# Patient Record
Sex: FEMALE | Employment: OTHER
[De-identification: names, ages, dates, MRNs, and addresses within clinical notes are randomized per-mention and may not be internally consistent; named-entity substitution may affect disease eponyms.]

---

## 2024-02-12 ENCOUNTER — TELEPHONE (OUTPATIENT)
Dept: SCHEDULING | Age: 89
End: 2024-02-12
Payer: MEDICARE

## 2024-02-12 NOTE — TELEPHONE ENCOUNTER
Placed call to patient to dorita FUV on 02/26 with Harshad to [facility]. Patient declined to dorita at this time until she can confirm a ride to [facility] for a new appt.     Patient will check and see if she is able to make transportation arrangements to that location or not and will call back to [place] to reschedule either way.

## 2024-02-13 NOTE — TELEPHONE ENCOUNTER
Patient called back this morning and does not want to drive to [facility] for care. Patient is requesting to see Dr. Shen here at [place].     I told her we would update the clinical teams to obtain a authorization to schedule with new physician and call her back to schedule. Patient understood.

## 2024-02-26 ENCOUNTER — APPOINTMENT (OUTPATIENT)
Dept: HEMATOLOGY/ONCOLOGY | Facility: CLINIC | Age: 89
End: 2024-02-26
Payer: MEDICARE

## 2024-02-29 RX ORDER — LOSARTAN POTASSIUM AND HYDROCHLOROTHIAZIDE 12.5; 5 MG/1; MG/1
TABLET ORAL
COMMUNITY
Start: 2024-01-26

## 2024-02-29 RX ORDER — MELOXICAM 7.5 MG/1
TABLET ORAL
COMMUNITY
Start: 2023-11-20

## 2024-02-29 RX ORDER — ANASTROZOLE 1 MG/1
TABLET ORAL
COMMUNITY
Start: 2024-01-26 | End: 2024-03-04 | Stop reason: WASHOUT

## 2024-02-29 RX ORDER — ATORVASTATIN CALCIUM 10 MG/1
TABLET, FILM COATED ORAL
COMMUNITY
Start: 2023-12-01

## 2024-03-04 ENCOUNTER — OFFICE VISIT (OUTPATIENT)
Dept: HEMATOLOGY/ONCOLOGY | Facility: CLINIC | Age: 89
End: 2024-03-04
Payer: MEDICARE

## 2024-03-04 VITALS
WEIGHT: 191.14 LBS | OXYGEN SATURATION: 95 % | RESPIRATION RATE: 16 BRPM | BODY MASS INDEX: 31.77 KG/M2 | TEMPERATURE: 98.6 F | DIASTOLIC BLOOD PRESSURE: 72 MMHG | SYSTOLIC BLOOD PRESSURE: 156 MMHG | HEART RATE: 81 BPM

## 2024-03-04 DIAGNOSIS — Z17.0 MALIGNANT NEOPLASM OF BREAST IN FEMALE, ESTROGEN RECEPTOR POSITIVE, UNSPECIFIED LATERALITY, UNSPECIFIED SITE OF BREAST (MULTI): Primary | ICD-10-CM

## 2024-03-04 DIAGNOSIS — Z09 ENCOUNTER FOR FOLLOW-UP EXAMINATION AFTER COMPLETED TREATMENT FOR CONDITIONS OTHER THAN MALIGNANT NEOPLASM: ICD-10-CM

## 2024-03-04 DIAGNOSIS — C50.919 MALIGNANT NEOPLASM OF BREAST IN FEMALE, ESTROGEN RECEPTOR POSITIVE, UNSPECIFIED LATERALITY, UNSPECIFIED SITE OF BREAST (MULTI): Primary | ICD-10-CM

## 2024-03-04 PROCEDURE — 99214 OFFICE O/P EST MOD 30 MIN: CPT | Performed by: STUDENT IN AN ORGANIZED HEALTH CARE EDUCATION/TRAINING PROGRAM

## 2024-03-04 PROCEDURE — 1159F MED LIST DOCD IN RCRD: CPT | Performed by: STUDENT IN AN ORGANIZED HEALTH CARE EDUCATION/TRAINING PROGRAM

## 2024-03-04 PROCEDURE — 1126F AMNT PAIN NOTED NONE PRSNT: CPT | Performed by: STUDENT IN AN ORGANIZED HEALTH CARE EDUCATION/TRAINING PROGRAM

## 2024-03-04 RX ORDER — ANASTROZOLE 1 MG/1
1 TABLET ORAL DAILY
Qty: 90 TABLET | Refills: 4 | Status: SHIPPED | OUTPATIENT
Start: 2024-03-04 | End: 2025-03-04

## 2024-03-04 ASSESSMENT — PAIN SCALES - GENERAL: PAINLEVEL: 0-NO PAIN

## 2024-03-04 ASSESSMENT — ENCOUNTER SYMPTOMS
DEPRESSION: 0
OCCASIONAL FEELINGS OF UNSTEADINESS: 0
LOSS OF SENSATION IN FEET: 1

## 2024-03-04 NOTE — PROGRESS NOTES
Breast [facility] Oncology Clinic  Location: [place]    Visit Type: Follow-up Visit    Oncologic History:    : Breast cancer of L breast. Details unknown, no records at this time. Per patient  she underwent lumpectomy, chemotherapy, radiation, tamoxifen for 5 years followed by aromatase inhibitor for a period of time.    Summer 2021: Bumped her chest in a doorway. Shortly later noticed bruised area in the R breast.    21: Diagnostic mammogram and US: right breast with multiple various size oval masses in the upper outer aspect middle depth. Irregular high density mass with a spiculated and indistinct margin in the right breast upper outer aspect middle depth.  Correlates with palpable. US shows this to be 2.2 X 2.4 X 2.3 cm irregular mass with an irregular margin in the right breast 10:00 6 cm from nipple. Mass is hypoechoic. There is also a 0.6X0.6X0.4 cm mass with a circumscribed margin the right breast at  9:00 middle depth 5 cm from the nipple. Middle other nodules present within the upper outer quadrant including a 3 mm round nodule at 11:00 4 cm from nipple and another 4 mm oval nodule at 9:00 3-4 cm from nipple. No abnormalities in axilla.     12/3/21: Right breast core needle biopsy at palpable lesion showed invasive moderately differentiated ductal carcinoma. Second area seen on US showed apocrine metaplasia with cyst formation and focal possible atypical lobular hyperplasia. ER positive  (%), PER positive (%), HER2 negative. Other small nodules not biopsied.   21: Right partial mastectomy with Dr. Padilla. Surgical pathology yielded invasive ductal carcinoma, 25mm, G2, invasive carcinoma margins negative, no lymph nodes submitted. [id number], stage IA    22: Started adjuvant anastrozole. Declined radiation   1/10/23: Follow-up R breast imagin.1 cm oval mass in the right breast at 10 o'clock middle depth is suspicious of malignancy. 6 mm adjacent mass.   23: R  breast bx: negative for malignancy, post-surgical changes  23: Diagnostic R breast imagin cm x 0.6 cm x 0.6 cm lobulated lesion in the right breast has a differential diagnosis of fat necrosis.  A surgical consult is   recommended.  The largest area is somewhat more prominent than the prior ultrasound.  Mammographically there has been no change.   24: Diagnostic R breast imagin.1 cm irregular mass with an angular margin in the right breast at 9 o'clock middle depth.  Previously this mass was measured as 2 separate masses but it appears to be one mass. The mass is not significantly changed in size.     Subjective: Interval History    Denies interval events since last follow-up- no recent hospitalizations, new medical diagnoses, or new medications.     Remains on anastrozole and is tolerating well.     There is a lesion in the R breast breast that is being followed. Subtle changes overtime. Biopsy has been recommended. She has elected to defer biopsy and prefers to monitor. Repeat imaging planned for 2024.     Denies weight loss, changes in the breast and/or chest wall, new aches or pains, changes in appetite or energy level. No current concerns at this time.     Gynecologic History:     Menarche 14, Menopause in her 60s  , 24 years old at first live birth  Underwent TAHBSO for cancer risk reduction  Used OCP for 10 years.   Did not use HRT or fertility treatments.   Has not undergone genetic testing    Pertinent Family history:    Breast Cancer family history in 2 sisters, daughter and paternal cousin.  No family history of ovarian cancer.   Father had prostate cancer.     Social History  Rebeca Muir  has no history on file for tobacco use.  She  has no history on file for alcohol use.  She  has no history on file for drug use.    ROS:     Review of Systems   All other systems reviewed and are negative.       Physical Examination:    /72   Pulse 81   Temp 37 °C (98.6 °F)   Resp 16  "  Wt 86.7 kg (191 lb 2.2 oz)   SpO2 95%   BMI 31.77 kg/m²     Physical Exam  Vitals and nursing note reviewed.   Constitutional:       General: She is not in acute distress.     Appearance: Normal appearance. She is not toxic-appearing.   HENT:      Head: Normocephalic and atraumatic.   Eyes:      Conjunctiva/sclera: Conjunctivae normal.   Cardiovascular:      Rate and Rhythm: Normal rate.   Pulmonary:      Effort: Pulmonary effort is normal. No respiratory distress.   Abdominal:      General: Abdomen is flat.      Palpations: Abdomen is soft.   Musculoskeletal:         General: No swelling. Normal range of motion.      Cervical back: Normal range of motion.   Skin:     General: Skin is warm and dry.   Neurological:      General: No focal deficit present.      Mental Status: She is alert.   Psychiatric:         Mood and Affect: Mood normal.       Breast Examination:    Left breast: No masses, skin or nipple changes  Right breast:  10:00 7 CFN 5 mm nodule at edge of prior surgical incision   Axillary: No significant examination findings    ECOG Performance Status:     [x] 0 Fully active, able to carry on all pre-disease performance without restriction  [] 1 Restricted in physically strenuous activity but ambulatory and able to carry out work of a light or sedentary nature, e.g., light house work, office work  [] 2 Ambulatory and capable of all selfcare but unable to carry out any work activities; up and about more than 50% of waking hours  [] 3 Capable of only limited selfcare; confined to bed or chair more than 50% of waking hours  [] 4 Completely disabled; cannot carry on any selfcare; totally confined to bed or chair  [] 5 Dead     Results:    Labs:  No new pertinent results since last visit    No results found for: \"WBC\", \"HGB\", \"HCT\", \"MCV\", \"PLT\", \"ANC\"    Chemistry    No results found for: \"NA\", \"K\", \"CL\", \"CO2\", \"BUN\", \"CREATININE\", \"GLU\" No results found for: \"CALCIUM\", \"ALKPHOS\", \"AST\", \"ALT\", \"BILITOT\" "          Imaging:  Reviewed above in Onc History    Pathology:  Reviewed above in Onc History    Assessment:     1. Pathologic prognostic stage IA (pT2 pNx cMx) invasive ductal carcinoma of the right breast; Grade 2; ER positive  (%), AK positive (%), HER2 negative;  S/p PM without SLNbx. On adjuvant anastrozole since Jan 2022. Declined radiation.     2. Right breast with area of apocrine metaplasia with cyst formation and focal possible atypical lobular hyperplasia.      3. Prior breast cancer in left breast in 1999 s/p PM, chemotherapy, radiation and endocrine therapy.     Tolerating anastrozole well. There is a small nodule in the R breast that is slowly changing over time. Biopsy has been recommended and patient has opted for surveillance. Repeat breast imaging planned for July 2024.       Plan:    Surgical Plan: S/p lumpectomy with Dr. Padilla. SLNBx not performed.   Additional biopsy: Not indicated at this time.  Radiation Plan: Met with radiation oncology, declined.  Additional staging scans/DEXA/echo: Staging scans not indicated given patient history and pathologic features. DEXA scan showed osteopenia. Repeat ordered.   Additional Path info (i.e Ki67, PDL1): Not indicated  Gene assays: Discussed oncotype however, given low clinical risk and patient age we agreed to defer testing as this would not .      Systemic treatment plan:  anastrozole for 5 years or as she is able to tolerate.                    Intent: Curative                Clinical trial: No clinical trial available for this patient                Endocrine therapy: Anastrozole as above                HER2 treatment: Not indicated                Targeted agents: Not indicated                Chemotherapy: Not indicated                BMA: Not indicated     Access: Not indicated  Supportive meds/referrals: Not indicated  Genetic testing: Strong family history, no prior genetic testing. Would not change treatment for  patient but would have implications for family members. Patient and her daughter are interested in exploring  this and will let me know if they would like a referral.   Fertility preservation: Not indicated  Other active problems/orders:      We spent a portion of our encounter discussing lifestyle modifications that may help with cancer outcomes and overall wellbeing. We discussed  regular exercise aiming for 30 minutes 5 times a week. We also discussed diet modifications such as limiting red meat and processed foods. We also discussed limiting alcohol intake.   Abnormal R breast imaging 1/2023, negative bx. There is a small nodule in the R breast that is slowly changing over time. Biopsy has been recommended and patient has opted for surveillance. Repeat breast imaging planned for July 2024.     Surveillance plan: Repeat planned for July 2024    Follow-up: 6 months    Patient expressed understanding of the plan outlined above. She had ample time to ask questions. She understands she can contact us should she have additional questions or issues arise in the interim.

## 2024-05-03 ENCOUNTER — TELEPHONE (OUTPATIENT)
Dept: HEMATOLOGY/ONCOLOGY | Facility: HOSPITAL | Age: 89
End: 2024-05-03
Payer: MEDICARE

## 2024-05-03 NOTE — TELEPHONE ENCOUNTER
Spoke to patient, patient would like Dr. Shen to review her dexa scan from 4/17/2024 at [facility] to her dexa scan at  on 2/1/22, secure chat sent to provider to review

## 2024-05-03 NOTE — TELEPHONE ENCOUNTER
Spoke to patient, advised Dr. Shen will call her Monday to review this, patient agreeable with plan, no further needs

## 2024-05-07 ENCOUNTER — TELEPHONE (OUTPATIENT)
Dept: HEMATOLOGY/ONCOLOGY | Facility: CLINIC | Age: 89
End: 2024-05-07
Payer: MEDICARE

## 2024-05-07 NOTE — TELEPHONE ENCOUNTER
I called patient to discuss DEXA again. Call went to . I left message with instructions to call back. May need to set up a time to talk.

## 2024-09-16 ENCOUNTER — APPOINTMENT (OUTPATIENT)
Dept: HEMATOLOGY/ONCOLOGY | Facility: CLINIC | Age: 89
End: 2024-09-16
Payer: MEDICARE

## 2024-09-23 ENCOUNTER — OFFICE VISIT (OUTPATIENT)
Dept: HEMATOLOGY/ONCOLOGY | Facility: CLINIC | Age: 89
End: 2024-09-23
Payer: MEDICARE

## 2024-09-23 VITALS
WEIGHT: 183.86 LBS | BODY MASS INDEX: 30.56 KG/M2 | OXYGEN SATURATION: 94 % | RESPIRATION RATE: 16 BRPM | DIASTOLIC BLOOD PRESSURE: 78 MMHG | HEART RATE: 85 BPM | SYSTOLIC BLOOD PRESSURE: 136 MMHG | TEMPERATURE: 97.5 F

## 2024-09-23 DIAGNOSIS — C50.919 MALIGNANT NEOPLASM OF BREAST IN FEMALE, ESTROGEN RECEPTOR POSITIVE, UNSPECIFIED LATERALITY, UNSPECIFIED SITE OF BREAST: ICD-10-CM

## 2024-09-23 DIAGNOSIS — Z17.0 MALIGNANT NEOPLASM OF BREAST IN FEMALE, ESTROGEN RECEPTOR POSITIVE, UNSPECIFIED LATERALITY, UNSPECIFIED SITE OF BREAST: ICD-10-CM

## 2024-09-23 PROCEDURE — 99215 OFFICE O/P EST HI 40 MIN: CPT | Performed by: STUDENT IN AN ORGANIZED HEALTH CARE EDUCATION/TRAINING PROGRAM

## 2024-09-23 PROCEDURE — 1036F TOBACCO NON-USER: CPT | Performed by: STUDENT IN AN ORGANIZED HEALTH CARE EDUCATION/TRAINING PROGRAM

## 2024-09-23 PROCEDURE — 1126F AMNT PAIN NOTED NONE PRSNT: CPT | Performed by: STUDENT IN AN ORGANIZED HEALTH CARE EDUCATION/TRAINING PROGRAM

## 2024-09-23 PROCEDURE — 1159F MED LIST DOCD IN RCRD: CPT | Performed by: STUDENT IN AN ORGANIZED HEALTH CARE EDUCATION/TRAINING PROGRAM

## 2024-09-23 ASSESSMENT — PAIN SCALES - GENERAL: PAINLEVEL: 0-NO PAIN

## 2024-09-23 NOTE — PROGRESS NOTES
FUV completed. Pt to return in one year for next FUV and call for any new issues or concerns. Pt verbalizes understanding per teach back method and is agreeable.

## 2025-04-07 DIAGNOSIS — Z17.0 MALIGNANT NEOPLASM OF BREAST IN FEMALE, ESTROGEN RECEPTOR POSITIVE, UNSPECIFIED LATERALITY, UNSPECIFIED SITE OF BREAST: Primary | ICD-10-CM

## 2025-04-07 DIAGNOSIS — C50.919 MALIGNANT NEOPLASM OF BREAST IN FEMALE, ESTROGEN RECEPTOR POSITIVE, UNSPECIFIED LATERALITY, UNSPECIFIED SITE OF BREAST: Primary | ICD-10-CM

## 2025-04-07 RX ORDER — ANASTROZOLE 1 MG/1
1 TABLET ORAL DAILY
Qty: 90 TABLET | Refills: 1 | Status: SHIPPED | OUTPATIENT
Start: 2025-04-07 | End: 2026-04-07

## 2025-05-05 ENCOUNTER — OFFICE VISIT (OUTPATIENT)
Dept: HEMATOLOGY/ONCOLOGY | Facility: CLINIC | Age: 89
End: 2025-05-05
Payer: MEDICARE

## 2025-05-05 VITALS
WEIGHT: 183.64 LBS | SYSTOLIC BLOOD PRESSURE: 151 MMHG | BODY MASS INDEX: 30.6 KG/M2 | OXYGEN SATURATION: 95 % | TEMPERATURE: 97.2 F | HEIGHT: 65 IN | HEART RATE: 88 BPM | RESPIRATION RATE: 16 BRPM | DIASTOLIC BLOOD PRESSURE: 84 MMHG

## 2025-05-05 DIAGNOSIS — C50.919 MALIGNANT NEOPLASM OF BREAST IN FEMALE, ESTROGEN RECEPTOR POSITIVE, UNSPECIFIED LATERALITY, UNSPECIFIED SITE OF BREAST: Primary | ICD-10-CM

## 2025-05-05 DIAGNOSIS — Z17.0 MALIGNANT NEOPLASM OF BREAST IN FEMALE, ESTROGEN RECEPTOR POSITIVE, UNSPECIFIED LATERALITY, UNSPECIFIED SITE OF BREAST: Primary | ICD-10-CM

## 2025-05-05 PROCEDURE — 99215 OFFICE O/P EST HI 40 MIN: CPT | Performed by: STUDENT IN AN ORGANIZED HEALTH CARE EDUCATION/TRAINING PROGRAM

## 2025-05-05 PROCEDURE — 1159F MED LIST DOCD IN RCRD: CPT | Performed by: STUDENT IN AN ORGANIZED HEALTH CARE EDUCATION/TRAINING PROGRAM

## 2025-05-05 PROCEDURE — 1126F AMNT PAIN NOTED NONE PRSNT: CPT | Performed by: STUDENT IN AN ORGANIZED HEALTH CARE EDUCATION/TRAINING PROGRAM

## 2025-05-05 RX ORDER — EXEMESTANE 25 MG/1
25 TABLET ORAL DAILY
Qty: 90 TABLET | Refills: 1 | Status: SHIPPED | OUTPATIENT
Start: 2025-05-05

## 2025-05-05 ASSESSMENT — PAIN SCALES - GENERAL: PAINLEVEL_OUTOF10: 0-NO PAIN

## 2025-05-05 NOTE — PROGRESS NOTES
FUV completed. POC reviewed with pt. who verbalizes understanding per teach back method. Pt agreeable to call our office for any questions, issues, or concerns. Next FUV with Dr Shen in 6 months. Pt to scheduling prior to discharge from office.

## 2025-05-05 NOTE — PROGRESS NOTES
Breast Medical Oncology Clinic  Location: [place]    Visit Type: Follow-up Visit    Oncologic History:    : Breast cancer of L breast. Details unknown, no records at this time. Per patient  she underwent lumpectomy, chemotherapy, radiation, tamoxifen for 5 years followed by aromatase inhibitor for a period of time.    Summer 2021: Bumped her chest in a doorway. Shortly later noticed bruised area in the R breast.    21: Diagnostic mammogram and US: right breast with multiple various size oval masses in the upper outer aspect middle depth. Irregular high density mass with a spiculated and indistinct margin in the right breast upper outer aspect middle depth.  Correlates with palpable. US shows this to be 2.2 X 2.4 X 2.3 cm irregular mass with an irregular margin in the right breast 10:00 6 cm from nipple. Mass is hypoechoic. There is also a 0.6X0.6X0.4 cm mass with a circumscribed margin the right breast at  9:00 middle depth 5 cm from the nipple. Middle other nodules present within the upper outer quadrant including a 3 mm round nodule at 11:00 4 cm from nipple and another 4 mm oval nodule at 9:00 3-4 cm from nipple. No abnormalities in axilla.     12/3/21: Right breast core needle biopsy at palpable lesion showed invasive moderately differentiated ductal carcinoma. Second area seen on US showed apocrine metaplasia with cyst formation and focal possible atypical lobular hyperplasia. ER positive  (%), PER positive (%), HER2 negative. Other small nodules not biopsied.   21: Right partial mastectomy with Dr. Padilla. Surgical pathology yielded invasive ductal carcinoma, 25mm, G2, invasive carcinoma margins negative, no lymph nodes submitted. [id number], stage IA    22: Started adjuvant anastrozole. Declined radiation   1/10/23: Follow-up R breast imagin.1 cm oval mass in the right breast at 10 o'clock middle depth is suspicious of malignancy. 6 mm adjacent mass.   23: R  breast bx: negative for malignancy, post-surgical changes  23: Diagnostic R breast imagin cm x 0.6 cm x 0.6 cm lobulated lesion in the right breast has a differential diagnosis of fat necrosis.  A surgical consult is   recommended.  The largest area is somewhat more prominent than the prior ultrasound.  Mammographically there has been no change.   24: Diagnostic R breast imagin.1 cm irregular mass with an angular margin in the right breast at 9 o'clock middle depth.  Previously this mass was measured as 2 separate masses but it appears to be one mass. The mass is not significantly changed in size.   2024: Ultrasound of the right breast shows at the 9 to 10 o'clock position 5 cm from the nipple there is a mass measuring 0.9 x 0.6 x 0.7 cm that abuts and extends into the adjacent skin.  This is not significantly changed compared to 2023.  6-month interval scan recommended  3/28/2025: R PM with Dr. Padilla: IDC G2 1.3 cm negative margins. rpT1c ER 80% MD 2% HER2 negative    Subjective: Interval History    History of Present Illness  Patient presents today for follow-up visit.     Underwent R PM 6 weeks ago. Recovering well. Planning for radiation locally for 20 tx.     Remains on anastrozole in this interval period. Tolerating well.     he reports no new onset of pain, including back, rib, or bone pain. Additionally, she reports no significant unintentional weight loss, changes in appetite, or energy levels.       Gynecologic History:     Menarche 14, Menopause in her 60s  , 24 years old at first live birth  Underwent TAHBSO for cancer risk reduction  Used OCP for 10 years.   Did not use HRT or fertility treatments.   Has not undergone genetic testing    Pertinent Family history:    Breast Cancer family history in 2 sisters, daughter and paternal cousin.  No family history of ovarian cancer.   Father had prostate cancer.     Social History  Rebeca Muir  reports that she has quit smoking.  "Her smoking use included cigarettes. She has been exposed to tobacco smoke. She has never used smokeless tobacco.  She  reports current alcohol use.  She  reports no history of drug use.    ROS:     Review of Systems   All other systems reviewed and are negative.       Physical Examination:    /84   Pulse 88   Temp 36.2 °C (97.2 °F) (Temporal)   Resp 16   Ht (S) 1.648 m (5' 4.88\")   Wt 83.3 kg (183 lb 10.3 oz)   SpO2 95%   BMI 30.67 kg/m²     Physical Exam  Vitals and nursing note reviewed.   Constitutional:       General: She is not in acute distress.     Appearance: Normal appearance. She is not toxic-appearing.   HENT:      Head: Normocephalic and atraumatic.   Eyes:      Conjunctiva/sclera: Conjunctivae normal.   Cardiovascular:      Rate and Rhythm: Normal rate.   Pulmonary:      Effort: Pulmonary effort is normal. No respiratory distress.   Abdominal:      General: Abdomen is flat.      Palpations: Abdomen is soft.   Musculoskeletal:         General: No swelling. Normal range of motion.      Cervical back: Normal range of motion.   Skin:     General: Skin is warm and dry.   Neurological:      General: No focal deficit present.      Mental Status: She is alert.   Psychiatric:         Mood and Affect: Mood normal.       Breast Examination:    Left breast: No masses, skin or nipple changes  Right breast: Well healed surgical incision.    Axillary: No significant examination findings    ECOG Performance Status:     [x] 0 Fully active, able to carry on all pre-disease performance without restriction  [] 1 Restricted in physically strenuous activity but ambulatory and able to carry out work of a light or sedentary nature, e.g., light house work, office work  [] 2 Ambulatory and capable of all selfcare but unable to carry out any work activities; up and about more than 50% of waking hours  [] 3 Capable of only limited selfcare; confined to bed or chair more than 50% of waking hours  [] 4 Completely " "disabled; cannot carry on any selfcare; totally confined to bed or chair  [] 5 Dead     Results:    Labs:  No new pertinent results since last visit    No results found for: \"WBC\", \"HGB\", \"HCT\", \"MCV\", \"PLT\", \"ANC\"    Chemistry    No results found for: \"NA\", \"K\", \"CL\", \"CO2\", \"BUN\", \"CREATININE\", \"GLU\" No results found for: \"CALCIUM\", \"ALKPHOS\", \"AST\", \"ALT\", \"BILITOT\"          Imaging:  Reviewed above in Onc History    Pathology:  Reviewed above in Onc History    Assessment:     1. 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     2. Pathologic prognostic stage IA (pT2 pNx cMx) invasive ductal carcinoma of the right breast; Grade 2; ER positive  (%), MO positive (%), HER2 negative;  S/p PM without SLNbx. On adjuvant anastrozole since Jan 2022. Declined radiation.     3. Prior breast cancer in left breast in 1999 s/p PM, chemotherapy, radiation and endocrine therapy.     Recurrent R breast cancer. S/p resection. Will be pursuing radiation this time. Will switch endocrine therapy to exemestane.       Plan:    Surgical Plan: S/p R lumpectomy with Dr. Padilla 2021. S/p R PM 2025 with Dr. Padilla for local recurrence. S/p L PM in 1999  Additional biopsy: Not indicated at this time.  Radiation Plan: Met with radiation oncology, declined in 2022. Will pursue radiation in light of R breast recurrence.   Additional staging scans/DEXA/echo: Staging scans not indicated given patient history and pathologic features. DEXA per PCP/endocrinologist  Additional Path info (i.e Ki67, PDL1): Not indicated  Gene assays: Discussed oncotype however, given low clinical risk and patient age we agreed to defer testing as this would not . We did not order in setting of recurrence.      Systemic treatment plan:  Anastrozole to be switched to exemestane in light of local recurrence. She will pause anastrozole during radiation for wash out and " start exemestane after radiation is complete.                    Intent: Curative                Clinical trial: No clinical trial available for this patient                Endocrine therapy: Anastrozole [id number]. Start exemestane.                 HER2 treatment: Not indicated                Targeted agents: Not indicated                Chemotherapy: Not indicated                BMA: Pending plan for osteoporosis by rheumatologist     Access: Not indicated  Supportive meds/referrals: Not indicated  Genetic testing: Strong family history, no prior genetic testing. Would not change treatment for patient but would have implications for family members. Patient and her daughter previously expressed interest in exploring this and will let me know if they would like a referral.   Fertility preservation: Not indicated  Other active problems/orders:      We spent a portion of our encounter discussing lifestyle modifications that may help with cancer outcomes and overall wellbeing. We discussed  regular exercise aiming for 30 minutes 5 times a week. We also discussed diet modifications such as limiting red meat and processed foods. We also discussed limiting alcohol intake.    Surveillance plan: Yearly mammo per breast surgeon    Follow-up: 6 months    Patient expressed understanding of the plan outlined above. She had ample time to ask questions. She understands she can contact us should she have additional questions or issues arise in the interim.

## 2025-09-22 ENCOUNTER — APPOINTMENT (OUTPATIENT)
Dept: HEMATOLOGY/ONCOLOGY | Facility: CLINIC | Age: 89
End: 2025-09-22
Payer: MEDICARE